# Patient Record
Sex: FEMALE
[De-identification: names, ages, dates, MRNs, and addresses within clinical notes are randomized per-mention and may not be internally consistent; named-entity substitution may affect disease eponyms.]

---
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
## 2023-01-16 ENCOUNTER — HOSPITAL ENCOUNTER (OUTPATIENT)
Dept: HOSPITAL 93 - RAD | Age: 23
Discharge: HOME | End: 2023-01-16
Attending: PEDIATRICS
Payer: COMMERCIAL

## 2023-01-16 DIAGNOSIS — M95.4: Primary | ICD-10-CM

## 2023-01-20 ENCOUNTER — HOSPITAL ENCOUNTER (OUTPATIENT)
Dept: HOSPITAL 93 - RAD | Age: 23
Discharge: HOME | End: 2023-01-20
Attending: PEDIATRICS
Payer: COMMERCIAL

## 2023-01-20 DIAGNOSIS — Q67.7: ICD-10-CM

## 2023-01-20 DIAGNOSIS — M21.869: Primary | ICD-10-CM
